# Patient Record
Sex: FEMALE | Race: WHITE | NOT HISPANIC OR LATINO | Employment: OTHER | URBAN - METROPOLITAN AREA
[De-identification: names, ages, dates, MRNs, and addresses within clinical notes are randomized per-mention and may not be internally consistent; named-entity substitution may affect disease eponyms.]

---

## 2021-07-16 ENCOUNTER — TELEPHONE (OUTPATIENT)
Dept: CARDIOLOGY | Facility: CLINIC | Age: 81
End: 2021-07-16

## 2021-07-16 DIAGNOSIS — E61.1 IRON DEFICIENCY: ICD-10-CM

## 2021-07-16 DIAGNOSIS — R06.02 SOB (SHORTNESS OF BREATH): ICD-10-CM

## 2021-07-16 DIAGNOSIS — E78.5 DYSLIPIDEMIA: ICD-10-CM

## 2021-07-16 DIAGNOSIS — Z11.59 NEED FOR HEPATITIS B SCREENING TEST: ICD-10-CM

## 2021-07-16 DIAGNOSIS — I27.20 PULMONARY HYPERTENSION (H): Primary | ICD-10-CM

## 2021-07-16 NOTE — TELEPHONE ENCOUNTER
----- Message from Olivia Paredes sent at 7/16/2021  3:02 PM CDT -----  Regarding: Orders  Hi Jacquelyn,    Could you place the orders for the HRCT, 6 minute walk test, pulmonary function test, PH labs? They will be here on 7/22.    Thank you,  -Olivia    --------------------------  All orders placed and was corrected that Dr. Burton will be seeing patient this week.  Jacquelyn Duong RN on 7/20/2021 at 2:04 PM

## 2021-07-22 ENCOUNTER — LAB (OUTPATIENT)
Dept: LAB | Facility: CLINIC | Age: 81
End: 2021-07-22
Attending: INTERNAL MEDICINE
Payer: COMMERCIAL

## 2021-07-22 ENCOUNTER — ANCILLARY PROCEDURE (OUTPATIENT)
Dept: CT IMAGING | Facility: CLINIC | Age: 81
End: 2021-07-22
Attending: INTERNAL MEDICINE
Payer: COMMERCIAL

## 2021-07-22 ENCOUNTER — OFFICE VISIT (OUTPATIENT)
Dept: CARDIOLOGY | Facility: CLINIC | Age: 81
End: 2021-07-22
Attending: INTERNAL MEDICINE
Payer: COMMERCIAL

## 2021-07-22 VITALS
OXYGEN SATURATION: 98 % | HEART RATE: 61 BPM | WEIGHT: 151 LBS | BODY MASS INDEX: 27.79 KG/M2 | SYSTOLIC BLOOD PRESSURE: 113 MMHG | HEIGHT: 62 IN | DIASTOLIC BLOOD PRESSURE: 59 MMHG

## 2021-07-22 DIAGNOSIS — E78.5 DYSLIPIDEMIA: ICD-10-CM

## 2021-07-22 DIAGNOSIS — I27.20 PULMONARY HYPERTENSION (H): ICD-10-CM

## 2021-07-22 DIAGNOSIS — Z11.59 NEED FOR HEPATITIS B SCREENING TEST: ICD-10-CM

## 2021-07-22 DIAGNOSIS — R06.02 SOB (SHORTNESS OF BREATH): ICD-10-CM

## 2021-07-22 DIAGNOSIS — R06.02 SOB (SHORTNESS OF BREATH): Primary | ICD-10-CM

## 2021-07-22 DIAGNOSIS — I27.20 PULMONARY HYPERTENSION (H): Primary | ICD-10-CM

## 2021-07-22 DIAGNOSIS — E61.1 IRON DEFICIENCY: ICD-10-CM

## 2021-07-22 LAB
ALBUMIN SERPL-MCNC: 3.3 G/DL (ref 3.4–5)
ALP SERPL-CCNC: 262 U/L (ref 40–150)
ALT SERPL W P-5'-P-CCNC: 14 U/L (ref 0–50)
ANION GAP SERPL CALCULATED.3IONS-SCNC: 7 MMOL/L (ref 3–14)
AST SERPL W P-5'-P-CCNC: 18 U/L (ref 0–45)
BILIRUB DIRECT SERPL-MCNC: 0.5 MG/DL (ref 0–0.2)
BILIRUB SERPL-MCNC: 1.1 MG/DL (ref 0.2–1.3)
BUN SERPL-MCNC: 36 MG/DL (ref 7–30)
CALCIUM SERPL-MCNC: 10.1 MG/DL (ref 8.5–10.1)
CHLORIDE BLD-SCNC: 106 MMOL/L (ref 94–109)
CHOLEST SERPL-MCNC: 123 MG/DL
CO2 SERPL-SCNC: 28 MMOL/L (ref 20–32)
CREAT SERPL-MCNC: 1.41 MG/DL (ref 0.52–1.04)
CRP SERPL-MCNC: 10.6 MG/L (ref 0–8)
ERYTHROCYTE [DISTWIDTH] IN BLOOD BY AUTOMATED COUNT: 16.5 % (ref 10–15)
FASTING STATUS PATIENT QL REPORTED: ABNORMAL
GFR SERPL CREATININE-BSD FRML MDRD: 34 ML/MIN/1.73M2
GLUCOSE BLD-MCNC: 91 MG/DL (ref 70–99)
HBV CORE AB SERPL QL IA: NONREACTIVE
HBV SURFACE AB SERPL IA-ACNC: 0 M[IU]/ML
HBV SURFACE AG SERPL QL IA: NONREACTIVE
HCT VFR BLD AUTO: 47.2 % (ref 35–47)
HCV AB SERPL QL IA: NONREACTIVE
HDLC SERPL-MCNC: 41 MG/DL
HGB BLD-MCNC: 15 G/DL (ref 11.7–15.7)
HIV 1+2 AB+HIV1 P24 AG SERPL QL IA: NONREACTIVE
INR PPP: 1.26 (ref 0.85–1.15)
IRON SATN MFR SERPL: 11 % (ref 15–46)
IRON SERPL-MCNC: 43 UG/DL (ref 35–180)
LDLC SERPL CALC-MCNC: 66 MG/DL
MCH RBC QN AUTO: 29.5 PG (ref 26.5–33)
MCHC RBC AUTO-ENTMCNC: 31.8 G/DL (ref 31.5–36.5)
MCV RBC AUTO: 93 FL (ref 78–100)
NONHDLC SERPL-MCNC: 82 MG/DL
NT-PROBNP SERPL-MCNC: ABNORMAL PG/ML (ref 0–450)
PLATELET # BLD AUTO: 151 10E3/UL (ref 150–450)
POTASSIUM BLD-SCNC: 4.6 MMOL/L (ref 3.4–5.3)
PROT SERPL-MCNC: 7 G/DL (ref 6.8–8.8)
RBC # BLD AUTO: 5.09 10E6/UL (ref 3.8–5.2)
SODIUM SERPL-SCNC: 141 MMOL/L (ref 133–144)
TIBC SERPL-MCNC: 397 UG/DL (ref 240–430)
TRIGL SERPL-MCNC: 80 MG/DL
TSH SERPL DL<=0.005 MIU/L-ACNC: 1.71 MU/L (ref 0.4–4)
WBC # BLD AUTO: 6.9 10E3/UL (ref 4–11)

## 2021-07-22 PROCEDURE — 86038 ANTINUCLEAR ANTIBODIES: CPT | Mod: 90 | Performed by: PATHOLOGY

## 2021-07-22 PROCEDURE — 94375 RESPIRATORY FLOW VOLUME LOOP: CPT | Performed by: INTERNAL MEDICINE

## 2021-07-22 PROCEDURE — 94729 DIFFUSING CAPACITY: CPT | Performed by: INTERNAL MEDICINE

## 2021-07-22 PROCEDURE — 84238 ASSAY NONENDOCRINE RECEPTOR: CPT | Mod: 90 | Performed by: PATHOLOGY

## 2021-07-22 PROCEDURE — 86039 ANTINUCLEAR ANTIBODIES (ANA): CPT | Mod: 90 | Performed by: PATHOLOGY

## 2021-07-22 PROCEDURE — 85610 PROTHROMBIN TIME: CPT | Performed by: PATHOLOGY

## 2021-07-22 PROCEDURE — 85730 THROMBOPLASTIN TIME PARTIAL: CPT | Mod: 90 | Performed by: PATHOLOGY

## 2021-07-22 PROCEDURE — 87340 HEPATITIS B SURFACE AG IA: CPT | Mod: 90 | Performed by: PATHOLOGY

## 2021-07-22 PROCEDURE — 83520 IMMUNOASSAY QUANT NOS NONAB: CPT | Mod: 90 | Performed by: PATHOLOGY

## 2021-07-22 PROCEDURE — 85613 RUSSELL VIPER VENOM DILUTED: CPT | Mod: 90 | Performed by: PATHOLOGY

## 2021-07-22 PROCEDURE — G0463 HOSPITAL OUTPT CLINIC VISIT: HCPCS | Mod: 25

## 2021-07-22 PROCEDURE — 86803 HEPATITIS C AB TEST: CPT | Mod: 90 | Performed by: PATHOLOGY

## 2021-07-22 PROCEDURE — 86431 RHEUMATOID FACTOR QUANT: CPT | Mod: 90 | Performed by: PATHOLOGY

## 2021-07-22 PROCEDURE — 82248 BILIRUBIN DIRECT: CPT | Performed by: PATHOLOGY

## 2021-07-22 PROCEDURE — 99205 OFFICE O/P NEW HI 60 MIN: CPT | Performed by: INTERNAL MEDICINE

## 2021-07-22 PROCEDURE — 93005 ELECTROCARDIOGRAM TRACING: CPT

## 2021-07-22 PROCEDURE — 85027 COMPLETE CBC AUTOMATED: CPT | Performed by: PATHOLOGY

## 2021-07-22 PROCEDURE — 85390 FIBRINOLYSINS SCREEN I&R: CPT | Mod: 26 | Performed by: PATHOLOGY

## 2021-07-22 PROCEDURE — 36415 COLL VENOUS BLD VENIPUNCTURE: CPT | Performed by: PATHOLOGY

## 2021-07-22 PROCEDURE — 86140 C-REACTIVE PROTEIN: CPT | Performed by: PATHOLOGY

## 2021-07-22 PROCEDURE — 86706 HEP B SURFACE ANTIBODY: CPT | Mod: 90 | Performed by: PATHOLOGY

## 2021-07-22 PROCEDURE — 99207 PR NO CHARGE LOS: CPT

## 2021-07-22 PROCEDURE — 84443 ASSAY THYROID STIM HORMONE: CPT | Performed by: PATHOLOGY

## 2021-07-22 PROCEDURE — 83550 IRON BINDING TEST: CPT | Performed by: PATHOLOGY

## 2021-07-22 PROCEDURE — 80053 COMPREHEN METABOLIC PANEL: CPT | Performed by: PATHOLOGY

## 2021-07-22 PROCEDURE — 80061 LIPID PANEL: CPT | Performed by: PATHOLOGY

## 2021-07-22 PROCEDURE — 71250 CT THORAX DX C-: CPT | Mod: GC | Performed by: RADIOLOGY

## 2021-07-22 PROCEDURE — 87389 HIV-1 AG W/HIV-1&-2 AB AG IA: CPT | Mod: 90 | Performed by: PATHOLOGY

## 2021-07-22 PROCEDURE — 83880 ASSAY OF NATRIURETIC PEPTIDE: CPT | Performed by: PATHOLOGY

## 2021-07-22 PROCEDURE — 86704 HEP B CORE ANTIBODY TOTAL: CPT | Mod: 90 | Performed by: PATHOLOGY

## 2021-07-22 RX ORDER — TORSEMIDE 10 MG/1
TABLET ORAL
COMMUNITY
Start: 2021-06-14

## 2021-07-22 RX ORDER — POTASSIUM CHLORIDE 1500 MG/1
TABLET, EXTENDED RELEASE ORAL
COMMUNITY
Start: 2021-06-25

## 2021-07-22 RX ORDER — CHLORAL HYDRATE 500 MG
1 CAPSULE ORAL
COMMUNITY

## 2021-07-22 RX ORDER — MAGNESIUM CARB/ALUMINUM HYDROX 105-160MG
1500 TABLET,CHEWABLE ORAL
COMMUNITY

## 2021-07-22 ASSESSMENT — PAIN SCALES - GENERAL: PAINLEVEL: NO PAIN (0)

## 2021-07-22 ASSESSMENT — MIFFLIN-ST. JEOR: SCORE: 1093.18

## 2021-07-22 NOTE — LETTER
2021      RE: Estefania Wall  [address]  [place] [address]       Dear Colleague,    Thank you for the opportunity to participate in the care of your patient, Estefania Wall, at the [facility] [place] [facility]. Please see a copy of my visit note below.    Service Date: 2021    Tank Cuevas MD   [facility]  [facility] [place] [address]    RE:      Estefania Wall   MRN:  [id number]  :   1940    Dear Dr. Cuevas:    We had the pleasure of seeing Ms. Estefania Wall in our Pulmonary Hypertension Clinic at the [facility].  Although you are familiar with her history, please allow me to summarize it for the purpose of our records.    Ms. Wall is a very pleasant, 81-year-old female with a past medical history significant for bioprosthetic aortic valve replacement.  She underwent a porcine aortic valve replacement in  at [facility] in [place].  She had coronary artery disease; however, this was not significant enough to warrant revascularization at the time of her aortic valve surgery.  She has no other significant past cardiac history.  Apparently, she was doing very well up until 1-2 years ago when she started noticing exertional shortness of breath.  This has been progressively getting worse for the last 1-2 years.  She has had multiple hospitalizations in the last year for shortness of breath and fluid retention.  She has been on supplemental oxygen now for the last 1 year.    She is currently functional class IV.  She uses a wheelchair or ambulates with a walker at home.  She has shortness of breath even at rest sometimes.  She has significant limitation even with very minimal exertion.  She has also been having lower extremity swelling.  This has been ongoing for the last 1-2 years.  She also endorses significant paroxysmal nocturnal  dyspnea and orthopnea.  She cannot sleep lying flat.  She has been hospitalized several times in the last year for heart failure symptoms with exertional shortness of breath, PND, orthopnea and fluid retention.  She has been hospitalized 3 times in the last month.  In fact, she was discharged this morning from [facility].  She went to the ER last Saturday because of significant exertional shortness of breath.  She underwent intravenous diuresis.  According to her , she was diuresed 5 pounds in the last 4 days.  She has not had any exertional syncope or presyncope.  No exertional chest pain or chest pressure.  She denies having palpitation.  No other specific symptoms.    During her last hospitalization at [facility], she underwent an echocardiogram.  This showed a moderate to severely dilated right ventricle with moderate to severely reduced right ventricular function.  Her right atrium was moderate to severely enlarged.  She had severe tricuspid regurgitation.  Her estimated right ventricular systolic pressure was 80 mmHg.  Her interventricular septum was flattened consistent with right ventricular pressure and volume overload.  Her left ventricular systolic function was normal.  Her left atrium was reportedly normal.  She had mitral annular calcification.  In light of her elevated right ventricular systolic pressure and RV dysfunction, she subsequently underwent a right heart catheterization.    Her right heart catheterization showed an RA pressure of 11, RV of 61/6, PA of 72/24 with a mean of 39, pulmonary capillary wedge pressure of 13, PA saturation of 65%, estimated Teresa cardiac output of 2.1 L/min/m2 with an index of 1.2 L/min/m2.  Her thermodilution cardiac output was 2.8 L/min/m2 with an index of 1.64 L/min/m2.  Her calculated PVR based on thermodilution cardiac output was 9.3 Wood units using a wedge pressure of 13 mmHg.    She had an acute vasodilator challenge with intravenous  Flolan at 8 mcg/kg/min.  With Flolan, her PA pressure was unchanged at 76/23 with a mean of 42.  Unfortunately, PA saturation, cardiac output and PVR were not calculated with intravenous Flolan.    In light of her severe pulmonary hypertension, she was referred to us for further evaluation and management.    PAST MEDICAL HISTORY:    1.  Status post porcine aortic valve replacement in 2008.   2.  Nonobstructive coronary artery disease.   3.  Glaucoma.  4.  Recent diagnosis of pulmonary hypertension.    PAST SURGICAL HISTORY:  Bioprosthetic aortic valve replacement.    CURRENT MEDICATIONS:    1.  Aspirin 81 mg daily.   2.  Vitamin D2, 5000 units daily.   3.  Omega 3 fatty acid 1 capsule daily.   4.  Chondroitin sulfate 1500 mcg daily.   5.  Potassium chloride 20 mEq twice a day.   6.  Torsemide 10 mg daily.    She was on Cardizem 60 mg twice a day up until her right heart catheterization.  Given her low cardiac index, her Cardizem was stopped.  She was also on Lasix 40 mg twice a day.  This was switched to torsemide during her most recent hospitalization.    REVIEW OF SYSTEMS:  A detailed 12 point review of systems was obtained.  She has no nausea, vomiting or abdominal pain.  She has no diarrhea.  No constipation.  No bright red blood per rectum.  No urinary symptoms.  Her appetite is okay.  No fever or chills.  No skin rashes or other symptoms.  All other systems reviewed and are negative.    PERSONAL HISTORY:  She has never smoked.  She used to drink alcohol socially in the past.  She has no history of recreational drug use.    SOCIAL HISTORY:  She is  and living with her .  This is the second marriage for her.  They have been  now for 11 years.  She has 4 kids through her first marriage.  Her kids are now older and in their 60s.      FAMILY HISTORY:  No significant family history of premature coronary artery disease, sudden cardiac death, pulmonary hypertension or congestive heart  failure.    PHYSICAL EXAMINATION:  She was comfortable at rest.  She got tachypnea with conversation.  Her blood pressure was 113/58.  Pulse rate was 61.  Respiratory rate was 16.  She was saturating 98% on 2 L of oxygen.  She was 5 feet 2 inches tall.  Her weight was 151 pounds.  Her BMI was 27.6.  She had no pallor, cyanosis or jaundice.  Her neck exam revealed jugular venous distention up to the angle of the jaw.  Her carotids were 1+ bilaterally.  Her extremities were cool bilaterally.  Her pulse was irregular.  Cardiac auscultation revealed a normal S1 and a normal S2.  She had a 3/6 ejection systolic murmur in the aortic area, right lower sternal border and in the mitral area.  She had a 2/6 early diastolic murmur in the pulmonic and aortic area.  She had no rub or gallop.  Auscultation of her lungs revealed bilateral basal crepitations.  She had equal air entry on both sides.  She had no wheezing.  Her abdomen was soft with normal bowel sounds. No tenderness, no rigidity, no guarding.  She had no focal neurological deficit.  Her extremities showed 2+ edema bilaterally.  Her upper extremities were cooler than her lower extremities.  She did not have any central cyanosis.    STUDIES:  She had an EKG today.  This showed a normal sinus rhythm with frequent PVCs.  She had possible left atrial enlargement.  She had right axis deviation.  She had incomplete right bundle branch block.  She had nonspecific ST and T-wave abnormalities.    She had a high-resolution CT scan of the chest today.  This showed severe pulmonary hypertension with dilated pulmonary artery up to 4.3 cm and dilated right atrium and right ventricle.  She had pulmonary vascular congestion with mild interlobular septal thickening suggestive of at least trace pulmonary edema.  She had heavy multivessel coronary artery calcification.  She also had 2 pulmonary nodules, the largest one measuring 7 mm in size.    She had a pulmonary function test today.   She had no evidence of obstruction.  Her FEV1/FVC was 77%.  Her lung volumes were reduced at FEV1 of 46%, FVC of 45%, and DLCO was reduced at 60%.  Given the lack of parenchymal abnormality on her high-resolution CT scan, I suspect that her lower lung volumes are due to effort.      Her CBC showed a hemoglobin of 15, hematocrit of 47.2, WBC of 6.9 and a platelet count of 151.      Her chemistry showed a sodium of 141, potassium of 4.6, chloride of 106, bicarb of 28, BUN of 36 and creatinine of 1.41.  Her calcium was 10.1.    Her LFTs showed a total protein of 7, albumin of 3.3, total bilirubin of 1.1, AST of 14, ALT of 18, alkaline phosphatase of 262 and a direct bilirubin of 0.5.    Her NT-proBNP was significantly elevated at 10,367.    Her TSH was unremarkable.  Her hepatitis, HIV, CHASE and rheumatoid factor were pending at the time of dictation.    ASSESSMENT AND PLAN:      In summary, Ms. Estefania Wall is a pleasant, 81-year-old female with a past medical history significant for bioprosthetic aortic valve replacement in 2008 and nonobstructive coronary artery disease who was recently diagnosed with severe pulmonary hypertension, severe RV dysfunction and clinically right heart failure.    Ms. Wall has very severe pulmonary hypertension and RV dysfunction.  Her mean PA pressure is 46 mmHg.  Her PVR is close to 10 Wood units.  Her cardiac output and index are significantly reduced.  She is currently functional class IV.  She is clinically in right heart failure with volume overload.    The etiology of her pulmonary hypertension is unclear to me.  She does not have any parenchymal lung disease on high-resolution CT scan, too.  While her PFTs showed reduced volume, with normal lung parenchyma on the CT, I suspect this is due to poor effect.  While her left-sided filling pressures were normal on the right heart catheterization, her echocardiogram does show moderate to severe mitral regurgitation that is eccentric in  nature.  The mean gradient across her aortic valve is also mildly elevated.  Clinically, also, she has PND and orthopnea.  She has not had a CT PE or a V/Q scan yet.    As a first step, I recommended her to have a CT PE protocol and a V/Q scan to exclude thromboembolic pulmonary hypertension.  We will also await her serological workup to exclude associated causes of pulmonary hypertension; more importantly, scleroderma.  I also recommended her to have a PATRICIA to further characterize her mitral regurgitation, her mitral valve and her prosthetic aortic valve.  It is very crucial that we exclude left heart disease on her before we consider pulmonary vasodilator therapy.  I recommended her to be admitted to the hospital to get all these tests done in an expedited fashion and also get more intravenous diuresis.  However, she is very reluctant to get admitted into the hospital as she is just getting discharged from [facility] today.  They would like to take a break psychologically and have this test done next week.  I have discussed this with Dr. Cuevas.  He will arrange for her to have a V/Q scan, CT PE protocol and PATRICIA done as an outpatient early next week.  I have requested her to go to the ER if her symptoms were to get worse, and we will transfer her as an inpatient.      We will decide on a treatment plan after she completes this testing.  Again, I am hesitant to start her on pulmonary vasodilator therapy without a clear etiology.  She was diuresed in the hospital up until today.  They have discharged her on torsemide 10 mg daily.  Her renal function is okay.  She is on low-dose aspirin.  I did not make any other changes to her medical therapy.    I discussed the workup, the diagnosis so far and the plan of care with her and her .      It was a pleasure meeting Ms. Estefania Wall in our Pulmonary Hypertension Clinic at the [facility].  We thank you for involving us in her  care.  Please do not hesitate to call us in the interim if you have any further questions.    Total time today was 90 minutes reviewing notes, imaging, labs, patient visit, orders and documentation     Sincerely,  Micheal Burton MD   [facility]  Heart Failure, Transplant, and Mechanical Circulatory Support Cardiology   Cardiovascular Division  [facility]   [phone]      D: 2021   T: 2021   MT: [email or web address]    Name:     ZULMA HINOJOSA  MRN:      [id number]        Account:      [id number]   :      1940           Service Date: 2021       Document: [id number]

## 2021-07-22 NOTE — PROGRESS NOTES
Service Date: 2021    Tank Cuevas MD   [facility]  [address] [place] [address]    RE:      Estefania Wall   MRN:  [id number]  :   1940    Dear Dr. Cuevas:    We had the pleasure of seeing Ms. Estefania Wall in our Pulmonary Hypertension Clinic at the [facility].  Although you are familiar with her history, please allow me to summarize it for the purpose of our records.    Ms. Wall is a very pleasant, 81-year-old female with a past medical history significant for bioprosthetic aortic valve replacement.  She underwent a porcine aortic valve replacement in  at [facility] in [place].  She had coronary artery disease; however, this was not significant enough to warrant revascularization at the time of her aortic valve surgery.  She has no other significant past cardiac history.  Apparently, she was doing very well up until 1-2 years ago when she started noticing exertional shortness of breath.  This has been progressively getting worse for the last 1-2 years.  She has had multiple hospitalizations in the last year for shortness of breath and fluid retention.  She has been on supplemental oxygen now for the last 1 year.    She is currently functional class IV.  She uses a wheelchair or ambulates with a walker at home.  She has shortness of breath even at rest sometimes.  She has significant limitation even with very minimal exertion.  She has also been having lower extremity swelling.  This has been ongoing for the last 1-2 years.  She also endorses significant paroxysmal nocturnal dyspnea and orthopnea.  She cannot sleep lying flat.  She has been hospitalized several times in the last year for heart failure symptoms with exertional shortness of breath, PND, orthopnea and fluid retention.  She has been hospitalized 3 times in the last month.  In fact, she was discharged this morning from [facility].  She went  to the ER last Saturday because of significant exertional shortness of breath.  She underwent intravenous diuresis.  According to her , she was diuresed 5 pounds in the last 4 days.  She has not had any exertional syncope or presyncope.  No exertional chest pain or chest pressure.  She denies having palpitation.  No other specific symptoms.    During her last hospitalization at [facility], she underwent an echocardiogram.  This showed a moderate to severely dilated right ventricle with moderate to severely reduced right ventricular function.  Her right atrium was moderate to severely enlarged.  She had severe tricuspid regurgitation.  Her estimated right ventricular systolic pressure was 80 mmHg.  Her interventricular septum was flattened consistent with right ventricular pressure and volume overload.  Her left ventricular systolic function was normal.  Her left atrium was reportedly normal.  She had mitral annular calcification.  In light of her elevated right ventricular systolic pressure and RV dysfunction, she subsequently underwent a right heart catheterization.    Her right heart catheterization showed an RA pressure of 11, RV of 61/6, PA of 72/24 with a mean of 39, pulmonary capillary wedge pressure of 13, PA saturation of 65%, estimated Teresa cardiac output of 2.1 L/min/m2 with an index of 1.2 L/min/m2.  Her thermodilution cardiac output was 2.8 L/min/m2 with an index of 1.64 L/min/m2.  Her calculated PVR based on thermodilution cardiac output was 9.3 Wood units using a wedge pressure of 13 mmHg.    She had an acute vasodilator challenge with intravenous Flolan at 8 mcg/kg/min.  With Flolan, her PA pressure was unchanged at 76/23 with a mean of 42.  Unfortunately, PA saturation, cardiac output and PVR were not calculated with intravenous Flolan.    In light of her severe pulmonary hypertension, she was referred to us for further evaluation and management.    PAST MEDICAL HISTORY:    1.  Status  post porcine aortic valve replacement in 2008.   2.  Nonobstructive coronary artery disease.   3.  Glaucoma.  4.  Recent diagnosis of pulmonary hypertension.    PAST SURGICAL HISTORY:  Bioprosthetic aortic valve replacement.    CURRENT MEDICATIONS:    1.  Aspirin 81 mg daily.   2.  Vitamin D2, 5000 units daily.   3.  Omega 3 fatty acid 1 capsule daily.   4.  Chondroitin sulfate 1500 mcg daily.   5.  Potassium chloride 20 mEq twice a day.   6.  Torsemide 10 mg daily.    She was on Cardizem 60 mg twice a day up until her right heart catheterization.  Given her low cardiac index, her Cardizem was stopped.  She was also on Lasix 40 mg twice a day.  This was switched to torsemide during her most recent hospitalization.    REVIEW OF SYSTEMS:  A detailed 12 point review of systems was obtained.  She has no nausea, vomiting or abdominal pain.  She has no diarrhea.  No constipation.  No bright red blood per rectum.  No urinary symptoms.  Her appetite is okay.  No fever or chills.  No skin rashes or other symptoms.  All other systems reviewed and are negative.    PERSONAL HISTORY:  She has never smoked.  She used to drink alcohol socially in the past.  She has no history of recreational drug use.    SOCIAL HISTORY:  She is  and living with her .  This is the second marriage for her.  They have been  now for 11 years.  She has 4 kids through her first marriage.  Her kids are now older and in their 60s.      FAMILY HISTORY:  No significant family history of premature coronary artery disease, sudden cardiac death, pulmonary hypertension or congestive heart failure.    PHYSICAL EXAMINATION:  She was comfortable at rest.  She got tachypnea with conversation.  Her blood pressure was 113/58.  Pulse rate was 61.  Respiratory rate was 16.  She was saturating 98% on 2 L of oxygen.  She was 5 feet 2 inches tall.  Her weight was 151 pounds.  Her BMI was 27.6.  She had no pallor, cyanosis or jaundice.  Her neck exam  revealed jugular venous distention up to the angle of the jaw.  Her carotids were 1+ bilaterally.  Her extremities were cool bilaterally.  Her pulse was irregular.  Cardiac auscultation revealed a normal S1 and a normal S2.  She had a 3/6 ejection systolic murmur in the aortic area, right lower sternal border and in the mitral area.  She had a 2/6 early diastolic murmur in the pulmonic and aortic area.  She had no rub or gallop.  Auscultation of her lungs revealed bilateral basal crepitations.  She had equal air entry on both sides.  She had no wheezing.  Her abdomen was soft with normal bowel sounds. No tenderness, no rigidity, no guarding.  She had no focal neurological deficit.  Her extremities showed 2+ edema bilaterally.  Her upper extremities were cooler than her lower extremities.  She did not have any central cyanosis.    STUDIES:  She had an EKG today.  This showed a normal sinus rhythm with frequent PVCs.  She had possible left atrial enlargement.  She had right axis deviation.  She had incomplete right bundle branch block.  She had nonspecific ST and T-wave abnormalities.    She had a high-resolution CT scan of the chest today.  This showed severe pulmonary hypertension with dilated pulmonary artery up to 4.3 cm and dilated right atrium and right ventricle.  She had pulmonary vascular congestion with mild interlobular septal thickening suggestive of at least trace pulmonary edema.  She had heavy multivessel coronary artery calcification.  She also had 2 pulmonary nodules, the largest one measuring 7 mm in size.    She had a pulmonary function test today.  She had no evidence of obstruction.  Her FEV1/FVC was 77%.  Her lung volumes were reduced at FEV1 of 46%, FVC of 45%, and DLCO was reduced at 60%.  Given the lack of parenchymal abnormality on her high-resolution CT scan, I suspect that her lower lung volumes are due to effort.      Her CBC showed a hemoglobin of 15, hematocrit of 47.2, WBC of 6.9 and a  platelet count of 151.      Her chemistry showed a sodium of 141, potassium of 4.6, chloride of 106, bicarb of 28, BUN of 36 and creatinine of 1.41.  Her calcium was 10.1.    Her LFTs showed a total protein of 7, albumin of 3.3, total bilirubin of 1.1, AST of 14, ALT of 18, alkaline phosphatase of 262 and a direct bilirubin of 0.5.    Her NT-proBNP was significantly elevated at 10,367.    Her TSH was unremarkable.  Her hepatitis, HIV, CHASE and rheumatoid factor were pending at the time of dictation.    ASSESSMENT AND PLAN:      In summary, Ms. Estefania Wall is a pleasant, 81-year-old female with a past medical history significant for bioprosthetic aortic valve replacement in 2008 and nonobstructive coronary artery disease who was recently diagnosed with severe pulmonary hypertension, severe RV dysfunction and clinically right heart failure.    Ms. Wall has very severe pulmonary hypertension and RV dysfunction.  Her mean PA pressure is 46 mmHg.  Her PVR is close to 10 Wood units.  Her cardiac output and index are significantly reduced.  She is currently functional class IV.  She is clinically in right heart failure with volume overload.    The etiology of her pulmonary hypertension is unclear to me.  She does not have any parenchymal lung disease on high-resolution CT scan, too.  While her PFTs showed reduced volume, with normal lung parenchyma on the CT, I suspect this is due to poor effect.  While her left-sided filling pressures were normal on the right heart catheterization, her echocardiogram does show moderate to severe mitral regurgitation that is eccentric in nature.  The mean gradient across her aortic valve is also mildly elevated.  Clinically, also, she has PND and orthopnea.  She has not had a CT PE or a V/Q scan yet.    As a first step, I recommended her to have a CT PE protocol and a V/Q scan to exclude thromboembolic pulmonary hypertension.  We will also await her serological workup to exclude  associated causes of pulmonary hypertension; more importantly, scleroderma.  I also recommended her to have a PATRICIA to further characterize her mitral regurgitation, her mitral valve and her prosthetic aortic valve.  It is very crucial that we exclude left heart disease on her before we consider pulmonary vasodilator therapy.  I recommended her to be admitted to the hospital to get all these tests done in an expedited fashion and also get more intravenous diuresis.  However, she is very reluctant to get admitted into the hospital as she is just getting discharged from [facility] today.  They would like to take a break psychologically and have this test done next week.  I have discussed this with Dr. Cuevas.  He will arrange for her to have a V/Q scan, CT PE protocol and PATRICIA done as an outpatient early next week.  I have requested her to go to the ER if her symptoms were to get worse, and we will transfer her as an inpatient.      We will decide on a treatment plan after she completes this testing.  Again, I am hesitant to start her on pulmonary vasodilator therapy without a clear etiology.  She was diuresed in the hospital up until today.  They have discharged her on torsemide 10 mg daily.  Her renal function is okay.  She is on low-dose aspirin.  I did not make any other changes to her medical therapy.    I discussed the workup, the diagnosis so far and the plan of care with her and her .      It was a pleasure meeting Ms. Estefania Wall in our Pulmonary Hypertension Clinic at the [facility].  We thank you for involving us in her care.  Please do not hesitate to call us in the interim if you have any further questions.    Total time today was 90 minutes reviewing notes, imaging, labs, patient visit, orders and documentation     Sincerely,  Micheal Burton MD   [facility]  Heart Failure, Transplant, and Mechanical Circulatory Support Cardiology    Cardiovascular Division  [facility]   [phone]            D: 2021   T: 2021   MT: jeff    Name:     ZULMA HINOJOSA  MRN:      [id number]        Account:      [id number]   :      1940           Service Date: 2021       Document: [id number]

## 2021-07-22 NOTE — NURSING NOTE
Chief Complaint   Patient presents with     New Patient     New PH referral from Dr. Raphael        Vitals were taken and medications were reconciled.    Yasemin Keith RMA  3:11 PM

## 2021-07-22 NOTE — LETTER
2021      RE: Estefania Wall  [address] [facility] [facility] [address] [facility] [address]  [facility] [address]       Service Date: 2021    Tank Cuevas MD   [facility]  [address] [place] [address]    RE:      Estefania Wall   MRN:  [id number]  :   1940    Dear Dr. Cuevas:    We had the pleasure of seeing Ms. Estefania Wall in our Pulmonary Hypertension Clinic at the [facility].  Although you are familiar with her history, please allow me to summarize it for the purpose of our records.    Ms. Wall is a very pleasant, 81-year-old female with a past medical history significant for bioprosthetic aortic valve replacement.  She underwent a porcine aortic valve replacement in  at [facility].  She had coronary artery disease; however, this was not significant enough to warrant revascularization at the time of her aortic valve surgery.  She has no other significant past cardiac history.  Apparently, she was doing very well up until 1-2 years ago when she started noticing exertional shortness of breath.  This has been progressively getting worse for the last 1-2 years.  She has had multiple hospitalizations in the last year for shortness of breath and fluid retention.  She has been on supplemental oxygen now for the last 1 year.    She is currently functional class IV.  She uses a wheelchair or ambulates with a walker at home.  She has shortness of breath even at rest sometimes.  She has significant limitation even with very minimal exertion.  She has also been having lower extremity swelling.  This has been ongoing for the last 1-2 years.  She also endorses significant paroxysmal nocturnal dyspnea and orthopnea.  She cannot sleep lying flat.  She has been hospitalized several times in the last year for heart failure symptoms with exertional shortness of breath, PND, orthopnea and fluid retention.  She has been hospitalized 3 times in the last  month.  In fact, she was discharged this morning from [facility].  She went to the ER last Saturday because of significant exertional shortness of breath.  She underwent intravenous diuresis.  According to her , she was diuresed 5 pounds in the last 4 days.  She has not had any exertional syncope or presyncope.  No exertional chest pain or chest pressure.  She denies having palpitation.  No other specific symptoms.    During her last hospitalization at [facility], she underwent an echocardiogram.  This showed a moderate to severely dilated right ventricle with moderate to severely reduced right ventricular function.  Her right atrium was moderate to severely enlarged.  She had severe tricuspid regurgitation.  Her estimated right ventricular systolic pressure was 80 mmHg.  Her interventricular septum was flattened consistent with right ventricular pressure and volume overload.  Her left ventricular systolic function was normal.  Her left atrium was reportedly normal.  She had mitral annular calcification.  In light of her elevated right ventricular systolic pressure and RV dysfunction, she subsequently underwent a right heart catheterization.    Her right heart catheterization showed an RA pressure of 11, RV of 61/6, PA of 72/24 with a mean of 39, pulmonary capillary wedge pressure of 13, PA saturation of 65%, estimated Teresa cardiac output of 2.1 L/min/m2 with an index of 1.2 L/min/m2.  Her thermodilution cardiac output was 2.8 L/min/m2 with an index of 1.64 L/min/m2.  Her calculated PVR based on thermodilution cardiac output was 9.3 Wood units using a wedge pressure of 13 mmHg.    She had an acute vasodilator challenge with intravenous Flolan at 8 mcg/kg/min.  With Flolan, her PA pressure was unchanged at 76/23 with a mean of 42.  Unfortunately, PA saturation, cardiac output and PVR were not calculated with intravenous Flolan.    In light of her severe pulmonary hypertension, she was  referred to us for further evaluation and management.    PAST MEDICAL HISTORY:    1.  Status post porcine aortic valve replacement in 2008.   2.  Nonobstructive coronary artery disease.   3.  Glaucoma.  4.  Recent diagnosis of pulmonary hypertension.    PAST SURGICAL HISTORY:  Bioprosthetic aortic valve replacement.    CURRENT MEDICATIONS:    1.  Aspirin 81 mg daily.   2.  Vitamin D2, 5000 units daily.   3.  Omega 3 fatty acid 1 capsule daily.   4.  Chondroitin sulfate 1500 mcg daily.   5.  Potassium chloride 20 mEq twice a day.   6.  Torsemide 10 mg daily.    She was on Cardizem 60 mg twice a day up until her right heart catheterization.  Given her low cardiac index, her Cardizem was stopped.  She was also on Lasix 40 mg twice a day.  This was switched to torsemide during her most recent hospitalization.    REVIEW OF SYSTEMS:  A detailed 12 point review of systems was obtained.  She has no nausea, vomiting or abdominal pain.  She has no diarrhea.  No constipation.  No bright red blood per rectum.  No urinary symptoms.  Her appetite is okay.  No fever or chills.  No skin rashes or other symptoms.  All other systems reviewed and are negative.    PERSONAL HISTORY:  She has never smoked.  She used to drink alcohol socially in the past.  She has no history of recreational drug use.    SOCIAL HISTORY:  She is  and living with her .  This is the second marriage for her.  They have been  now for 11 years.  She has 4 kids through her first marriage.  Her kids are now older and in their 60s.      FAMILY HISTORY:  No significant family history of premature coronary artery disease, sudden cardiac death, pulmonary hypertension or congestive heart failure.    PHYSICAL EXAMINATION:  She was comfortable at rest.  She got tachypnea with conversation.  Her blood pressure was 113/58.  Pulse rate was 61.  Respiratory rate was 16.  She was saturating 98% on 2 L of oxygen.  She was 5 feet 2 inches tall.  Her weight  was 151 pounds.  Her BMI was 27.6.  She had no pallor, cyanosis or jaundice.  Her neck exam revealed jugular venous distention up to the angle of the jaw.  Her carotids were 1+ bilaterally.  Her extremities were cool bilaterally.  Her pulse was irregular.  Cardiac auscultation revealed a normal S1 and a normal S2.  She had a 3/6 ejection systolic murmur in the aortic area, right lower sternal border and in the mitral area.  She had a 2/6 early diastolic murmur in the pulmonic and aortic area.  She had no rub or gallop.  Auscultation of her lungs revealed bilateral basal crepitations.  She had equal air entry on both sides.  She had no wheezing.  Her abdomen was soft with normal bowel sounds. No tenderness, no rigidity, no guarding.  She had no focal neurological deficit.  Her extremities showed 2+ edema bilaterally.  Her upper extremities were cooler than her lower extremities.  She did not have any central cyanosis.    STUDIES:  She had an EKG today.  This showed a normal sinus rhythm with frequent PVCs.  She had possible left atrial enlargement.  She had right axis deviation.  She had incomplete right bundle branch block.  She had nonspecific ST and T-wave abnormalities.    She had a high-resolution CT scan of the chest today.  This showed severe pulmonary hypertension with dilated pulmonary artery up to 4.3 cm and dilated right atrium and right ventricle.  She had pulmonary vascular congestion with mild interlobular septal thickening suggestive of at least trace pulmonary edema.  She had heavy multivessel coronary artery calcification.  She also had 2 pulmonary nodules, the largest one measuring 7 mm in size.    She had a pulmonary function test today.  She had no evidence of obstruction.  Her FEV1/FVC was 77%.  Her lung volumes were reduced at FEV1 of 46%, FVC of 45%, and DLCO was reduced at 60%.  Given the lack of parenchymal abnormality on her high-resolution CT scan, I suspect that her lower lung volumes are  due to effort.      Her CBC showed a hemoglobin of 15, hematocrit of 47.2, WBC of 6.9 and a platelet count of 151.      Her chemistry showed a sodium of 141, potassium of 4.6, chloride of 106, bicarb of 28, BUN of 36 and creatinine of 1.41.  Her calcium was 10.1.    Her LFTs showed a total protein of 7, albumin of 3.3, total bilirubin of 1.1, AST of 14, ALT of 18, alkaline phosphatase of 262 and a direct bilirubin of 0.5.    Her NT-proBNP was significantly elevated at 10,367.    Her TSH was unremarkable.  Her hepatitis, HIV, CHASE and rheumatoid factor were pending at the time of dictation.    ASSESSMENT AND PLAN:      In summary, Ms. Estefania Wall is a pleasant, 81-year-old female with a past medical history significant for bioprosthetic aortic valve replacement in 2008 and nonobstructive coronary artery disease who was recently diagnosed with severe pulmonary hypertension, severe RV dysfunction and clinically right heart failure.    Ms. Wall has very severe pulmonary hypertension and RV dysfunction.  Her mean PA pressure is 46 mmHg.  Her PVR is close to 10 Wood units.  Her cardiac output and index are significantly reduced.  She is currently functional class IV.  She is clinically in right heart failure with volume overload.    The etiology of her pulmonary hypertension is unclear to me.  She does not have any parenchymal lung disease on high-resolution CT scan, too.  While her PFTs showed reduced volume, with normal lung parenchyma on the CT, I suspect this is due to poor effect.  While her left-sided filling pressures were normal on the right heart catheterization, her echocardiogram does show moderate to severe mitral regurgitation that is eccentric in nature.  The mean gradient across her aortic valve is also mildly elevated.  Clinically, also, she has PND and orthopnea.  She has not had a CT PE or a V/Q scan yet.    As a first step, I recommended her to have a CT PE protocol and a V/Q scan to exclude  thromboembolic pulmonary hypertension.  We will also await her serological workup to exclude associated causes of pulmonary hypertension; more importantly, scleroderma.  I also recommended her to have a PATRICIA to further characterize her mitral regurgitation, her mitral valve and her prosthetic aortic valve.  It is very crucial that we exclude left heart disease on her before we consider pulmonary vasodilator therapy.  I recommended her to be admitted to the hospital to get all these tests done in an expedited fashion and also get more intravenous diuresis.  However, she is very reluctant to get admitted into the hospital as she is just getting discharged from [facility] today.  They would like to take a break psychologically and have this test done next week.  I have discussed this with Dr. Cuevas.  He will arrange for her to have a V/Q scan, CT PE protocol and PATRICIA done as an outpatient early next week.  I have requested her to go to the ER if her symptoms were to get worse, and we will transfer her as an inpatient.      We will decide on a treatment plan after she completes this testing.  Again, I am hesitant to start her on pulmonary vasodilator therapy without a clear etiology.  She was diuresed in the hospital up until today.  They have discharged her on torsemide 10 mg daily.  Her renal function is okay.  She is on low-dose aspirin.  I did not make any other changes to her medical therapy.    I discussed the workup, the diagnosis so far and the plan of care with her and her .      It was a pleasure meeting Ms. Estefania Wall in our Pulmonary Hypertension Clinic at the [facility].  We thank you for involving us in her care.  Please do not hesitate to call us in the interim if you have any further questions.    Total time today was 90 minutes reviewing notes, imaging, labs, patient visit, orders and documentation     Sincerely,  Micheal Burton MD   [facility]  Pulmonary Hypertension  Heart Failure, Transplant, and Mechanical Circulatory Support Cardiology   Cardiovascular Division  [facility]   [phone]            D: 2021   T: 2021   MT: [email or web address]    Name:     ZULMA HINOJOSA  MRN:      [id number]        Account:      [id number]   :      1940           Service Date: 2021       Document: [id number]        Micheal Burton MD

## 2021-07-22 NOTE — PATIENT INSTRUCTIONS
Medication Changes:  -none today    Patient Instructions:  1. Continue staying active and eat a heart healthy diet.    2. Please keep current list of medications with you at all times.    3. Remember to weigh yourself daily after voiding and before you consume any food or beverages and log the numbers.  If you have gained 2 pounds overnight or 5 pounds in a week contact us immediately for medication adjustments or further instructions.    4. **Please call us immediately if you have any syncope (fainting or passing out), chest pain, edema (swelling or weight gain), or decline in your functional status (general decline in how you are feeling).    5. Patients on Remodulin (treprostinil) or Veletri (epoprostenol): Please make sure that you have your backup pump and supplies with you at all times, your mixing instructions, and contact information for your specialty pharmacy.    Follow up Appointment Information:  -Dr. Burton spoke with Dr. Cuevas and his office will call you to schedule a CT, Transesophageal Echo and VQ scan locally.  Once completed, they will forward results to us.    -If you require hospitalization, please proceed to the local [facility] and we will plan to transfer you to the [facility] for ongoing care & continued workup.  =====================================================  We are located on the third floor of the [facility] ([facility]) on the [facility].  Our address is     [address] [place] [address] on 3rd Floor   [place] [id number]    Thank you for allowing us to be a part of your care here at the [facility]    If you have questions or concerns please contact us at:    Jacquelyn Duong, RN, BSN, PHN  Olivia Paredes (Scheduling,Prior Auth)  Nurse Coordinator     Clinic   Pulmonary Hypertension   Pulmonary Hypertension  [facility]  (Phone)[phone]   (Phone) [phone]        (Fax) [phone]    ** Please note that you will NOT receive a reminder call regarding your scheduled testing, reminder calls are for provider appointments only.  If you are scheduled for testing within the [facility] system you may receive a call regarding pre-registration for billing purposes only.**     Support Group:  Pulmonary Hypertension Association  Https://www.phassociation.org/  **Look at the Events Tab** They even have Support Groups that you can call into    [facility] PH Support Group  Second Saturday of the Month from 1-3 PM   Location: [address] [place] [address]  Leader: Marilee Carlson and Bertha Prado  Phone: [phone] or [phone]  Email: [email or web address]

## 2021-07-22 NOTE — NURSING NOTE
-Dr. Burton spoke with Dr. Cuevas and his office will call you to schedule a CT, Transesophageal Echo and VQ scan locally.  Once completed, they will forward results to us.    -If you require hospitalization, please proceed to the local [facility] and we will plan to transfer you to the [facility] for ongoing care & continued workup.    Above plan was reviewed with  & patient. Jacquelyn Duong RN on 7/22/2021 at 4:37 PM

## 2021-07-23 LAB
ATRIAL RATE - MUSE: 82 BPM
DIASTOLIC BLOOD PRESSURE - MUSE: NORMAL MMHG
DLCOUNC-%PRED-PRE: 60 %
DLCOUNC-PRE: 10.47 ML/MIN/MMHG
DLCOUNC-PRED: 17.37 ML/MIN/MMHG
ERV-%PRED-PRE: 12 %
ERV-PRE: 0.09 L
ERV-PRED: 0.69 L
EXPTIME-PRE: 5.27 SEC
FEF2575-%PRED-PRE: 45 %
FEF2575-PRE: 0.63 L/SEC
FEF2575-PRED: 1.41 L/SEC
FEFMAX-%PRED-PRE: 54 %
FEFMAX-PRE: 2.29 L/SEC
FEFMAX-PRED: 4.19 L/SEC
FEV1-%PRED-PRE: 46 %
FEV1-PRE: 0.81 L
FEV1FEV6-PRE: 77 %
FEV1FEV6-PRED: 77 %
FEV1FVC-PRE: 77 %
FEV1FVC-PRED: 77 %
FEV1SVC-PRE: 64 %
FEV1SVC-PRED: 69 %
FIFMAX-PRE: 1.66 L/SEC
FVC-%PRED-PRE: 45 %
FVC-PRE: 1.05 L
FVC-PRED: 2.29 L
IC-%PRED-PRE: 63 %
IC-PRE: 1.17 L
IC-PRED: 1.85 L
IL6 SERPL-MCNC: 13.41 PG/ML
INTERPRETATION ECG - MUSE: NORMAL
P AXIS - MUSE: 87 DEGREES
PR INTERVAL - MUSE: 186 MS
QRS DURATION - MUSE: 96 MS
QT - MUSE: 398 MS
QTC - MUSE: 464 MS
R AXIS - MUSE: 90 DEGREES
RHEUMATOID FACT SER NEPH-ACNC: 8 IU/ML
STFR SERPL-MCNC: 8.7 MG/L
SYSTOLIC BLOOD PRESSURE - MUSE: NORMAL MMHG
T AXIS - MUSE: 71 DEGREES
VA-%PRED-PRE: 123 %
VA-PRE: 5.19 L
VC-%PRED-PRE: 49 %
VC-PRE: 1.26 L
VC-PRED: 2.53 L
VENTRICULAR RATE- MUSE: 82 BPM

## 2021-07-26 LAB
ANA PAT SER IF-IMP: ABNORMAL
ANA SER QL IF: POSITIVE
ANA TITR SER IF: ABNORMAL {TITER}
DRVVT SCREEN RATIO: 1.07
LA PPP-IMP: NEGATIVE
LUPUS INTERPRETATION: ABNORMAL
PLATELET NEUTRALIZATION: -7 SECONDS
PTT 1:2 MIX: 41 SECONDS (ref 31–45)
PTT RATIO: 1.39
REVIEWING PATHOLOGIST:: ABNORMAL
THROMBIN TIME: 17.4 SECONDS (ref 13–19)

## 2022-10-14 ENCOUNTER — TELEPHONE (OUTPATIENT)
Dept: CARDIOLOGY | Facility: CLINIC | Age: 82
End: 2022-10-14

## 2022-10-14 NOTE — TELEPHONE ENCOUNTER
----- Message from Micheal Burton MD sent at 10/14/2022  4:34 AM CDT -----  Regarding: RE: Review chart  I agree team. She has several other cardiac issues in addition to her PH. She is being managed by [facility] cardiology.     No need for us to see her.    Thank you    TT      ----- Message -----  From: Jacquelyn Duong RN  Sent: 10/12/2022   8:44 AM CDT  To: Micheal Villa MD  Subject: Review chart                                     Dr. Burton,    This patient needs to come back for a follow up with you, but it appears she has had a lot transpire since you saw her in July.  Could you review her chart and let me know if their is any testing you would like her to have before you see her for follow up.    Thanks,  Jacquelyn